# Patient Record
Sex: FEMALE | Race: WHITE | Employment: FULL TIME
[De-identification: names, ages, dates, MRNs, and addresses within clinical notes are randomized per-mention and may not be internally consistent; named-entity substitution may affect disease eponyms.]

---

## 2017-10-01 ENCOUNTER — HEALTH MAINTENANCE LETTER (OUTPATIENT)
Age: 32
End: 2017-10-01

## 2019-02-05 ENCOUNTER — HOSPITAL ENCOUNTER (EMERGENCY)
Facility: CLINIC | Age: 34
Discharge: HOME | End: 2019-02-05
Admitting: PHYSICIAN ASSISTANT
Payer: COMMERCIAL

## 2019-02-05 VITALS
RESPIRATION RATE: 16 BRPM | BODY MASS INDEX: 24.11 KG/M2 | OXYGEN SATURATION: 100 % | WEIGHT: 150 LBS | HEIGHT: 66 IN | DIASTOLIC BLOOD PRESSURE: 73 MMHG | TEMPERATURE: 98.2 F | SYSTOLIC BLOOD PRESSURE: 126 MMHG

## 2019-02-05 LAB
ALBUMIN SERPL-MCNC: 3.7 G/DL (ref 3.4–5)
ALP SERPL-CCNC: 57 U/L (ref 40–150)
ALT SERPL W P-5'-P-CCNC: 32 U/L (ref 0–50)
ANION GAP SERPL CALCULATED.3IONS-SCNC: 7 MMOL/L (ref 3–14)
AST SERPL W P-5'-P-CCNC: 20 U/L (ref 0–45)
BASOPHILS # BLD AUTO: 0 10E9/L (ref 0–0.2)
BASOPHILS NFR BLD AUTO: 0.5 %
BILIRUB SERPL-MCNC: 0.3 MG/DL (ref 0.2–1.3)
BUN SERPL-MCNC: 16 MG/DL (ref 7–30)
CALCIUM SERPL-MCNC: 8.2 MG/DL (ref 8.5–10.1)
CHLORIDE SERPL-SCNC: 109 MMOL/L (ref 94–109)
CO2 SERPL-SCNC: 25 MMOL/L (ref 20–32)
CREAT SERPL-MCNC: 0.78 MG/DL (ref 0.52–1.04)
DIFFERENTIAL METHOD BLD: NORMAL
EOSINOPHIL # BLD AUTO: 0.2 10E9/L (ref 0–0.7)
EOSINOPHIL NFR BLD AUTO: 2.5 %
ERYTHROCYTE [DISTWIDTH] IN BLOOD BY AUTOMATED COUNT: 11.7 % (ref 10–15)
GFR SERPL CREATININE-BSD FRML MDRD: >90 ML/MIN/{1.73_M2}
GLUCOSE SERPL-MCNC: 108 MG/DL (ref 70–99)
HCT VFR BLD AUTO: 39.1 % (ref 35–47)
HGB BLD-MCNC: 13.4 G/DL (ref 11.7–15.7)
IMM GRANULOCYTES # BLD: 0 10E9/L (ref 0–0.4)
IMM GRANULOCYTES NFR BLD: 0.2 %
INTERPRETATION ECG - MUSE: NORMAL
LYMPHOCYTES # BLD AUTO: 2.4 10E9/L (ref 0.8–5.3)
LYMPHOCYTES NFR BLD AUTO: 40.1 %
MCH RBC QN AUTO: 30.8 PG (ref 26.5–33)
MCHC RBC AUTO-ENTMCNC: 34.3 G/DL (ref 31.5–36.5)
MCV RBC AUTO: 90 FL (ref 78–100)
MONOCYTES # BLD AUTO: 0.4 10E9/L (ref 0–1.3)
MONOCYTES NFR BLD AUTO: 6.7 %
NEUTROPHILS # BLD AUTO: 3 10E9/L (ref 1.6–8.3)
NEUTROPHILS NFR BLD AUTO: 50 %
NRBC # BLD AUTO: 0 10*3/UL
NRBC BLD AUTO-RTO: 0 /100
PLATELET # BLD AUTO: 236 10E9/L (ref 150–450)
POTASSIUM SERPL-SCNC: 3.4 MMOL/L (ref 3.4–5.3)
PROT SERPL-MCNC: 7 G/DL (ref 6.8–8.8)
RBC # BLD AUTO: 4.35 10E12/L (ref 3.8–5.2)
SODIUM SERPL-SCNC: 141 MMOL/L (ref 133–144)
TROPONIN I SERPL-MCNC: <0.015 UG/L (ref 0–0.04)
WBC # BLD AUTO: 6 10E9/L (ref 4–11)

## 2019-02-05 PROCEDURE — 85025 COMPLETE CBC W/AUTO DIFF WBC: CPT | Performed by: EMERGENCY MEDICINE

## 2019-02-05 PROCEDURE — 80053 COMPREHEN METABOLIC PANEL: CPT | Performed by: EMERGENCY MEDICINE

## 2019-02-05 PROCEDURE — 40000268 ZZH STATISTIC NO CHARGES

## 2019-02-05 PROCEDURE — 84484 ASSAY OF TROPONIN QUANT: CPT | Performed by: EMERGENCY MEDICINE

## 2019-02-05 ASSESSMENT — MIFFLIN-ST. JEOR: SCORE: 1392.15

## 2019-07-09 ENCOUNTER — OFFICE VISIT (OUTPATIENT)
Dept: FAMILY MEDICINE | Facility: CLINIC | Age: 34
End: 2019-07-09
Payer: COMMERCIAL

## 2019-07-09 VITALS
DIASTOLIC BLOOD PRESSURE: 64 MMHG | SYSTOLIC BLOOD PRESSURE: 102 MMHG | HEART RATE: 90 BPM | TEMPERATURE: 98.5 F | RESPIRATION RATE: 16 BRPM | HEIGHT: 66 IN | BODY MASS INDEX: 23.3 KG/M2 | OXYGEN SATURATION: 99 % | WEIGHT: 145 LBS

## 2019-07-09 DIAGNOSIS — T78.40XD ALLERGIC DISORDER, SUBSEQUENT ENCOUNTER: ICD-10-CM

## 2019-07-09 DIAGNOSIS — R07.0 THROAT PAIN: ICD-10-CM

## 2019-07-09 DIAGNOSIS — J40 BRONCHITIS: ICD-10-CM

## 2019-07-09 DIAGNOSIS — R05.9 COUGH: Primary | ICD-10-CM

## 2019-07-09 LAB
DEPRECATED S PYO AG THROAT QL EIA: NORMAL
SPECIMEN SOURCE: NORMAL

## 2019-07-09 PROCEDURE — 99203 OFFICE O/P NEW LOW 30 MIN: CPT | Performed by: FAMILY MEDICINE

## 2019-07-09 PROCEDURE — 87880 STREP A ASSAY W/OPTIC: CPT | Performed by: FAMILY MEDICINE

## 2019-07-09 PROCEDURE — 87081 CULTURE SCREEN ONLY: CPT | Performed by: FAMILY MEDICINE

## 2019-07-09 RX ORDER — CEFPROZIL 500 MG/1
500 TABLET, FILM COATED ORAL 2 TIMES DAILY
Qty: 20 TABLET | Refills: 0 | Status: SHIPPED | OUTPATIENT
Start: 2019-07-09 | End: 2020-12-29

## 2019-07-09 RX ORDER — FEXOFENADINE HCL 180 MG/1
180 TABLET ORAL DAILY
Qty: 30 TABLET | COMMUNITY
Start: 2019-07-09 | End: 2020-12-29

## 2019-07-09 ASSESSMENT — MIFFLIN-ST. JEOR: SCORE: 1369.47

## 2019-07-09 NOTE — PROGRESS NOTES
Subjective     Farida Dasilva is a 33 year old female who presents to clinic today for the following health issues:    HPI   RESPIRATORY SYMPTOMS      Duration: 3 weeks    Description  sore throat and cough and some phlegm. Getting darker brown again and yellowish . sometimes felt sob but no wheezing . She was also given oral prednisone and inhaler. She thinks  prednisone helped although not sure if inhaler helped much,  so used for couple of days earlier but not any more .      Has hx of allergies mostly to cats, but does  not really does not take anything for that except OTC med's  when she is around cats , and she was around cats recently , but not any more.  but wondering if she could be developing allergies to other things in environment.         she has used inhaler  Sometimes, only  when she is sick .     Severity: moderate    Accompanying signs and symptoms: No fever or chills recently except ongoing cough, worse at night, has some phlegm in throat and coughing up thick dark phlegm again, so feels like z pack did not help much     History (predisposing factors):  none    Precipitating or alleviating factors: None    Therapies tried and outcome:  zpak from urgent care. It helped at first but not anymore          Reviewed and updated as needed this visit by Provider         Review of Systems   ROS COMP: Constitutional, HEENT, cardiovascular, pulmonary, GI, , musculoskeletal, neuro, skin, endocrine and psych systems are negative, except as otherwise noted.      Objective    There were no vitals taken for this visit.  There is no height or weight on file to calculate BMI.  Physical Exam   GENERAL: healthy, alert and no distress  EYES: Eyes grossly normal to inspection,   HENT: ear canals and TM's normal, nasal mucosa edematous , oropharynx clear and oral mucous membranes moist  NECK: no adenopathy, no asymmetry, masses, or scars and thyroid normal to palpation  RESP: lungs clear to auscultation - no rales,  rhonchi or wheezes  CV: regular rate and rhythm, normal S1 S2,   ABDOMEN: soft, nontender, no hepatosplenomegaly, no masses and bowel sounds normal    Diagnostic Test Results:  Labs reviewed in Epic  Strep screen - Negative        Assessment & Plan     (R05) Cough  (primary encounter diagnosis)  Comment: multifactorial recent ongoing uri / allergies   Plan: cefPROZIL (CEFZIL) 500 MG tablet, fexofenadine         (ALLEGRA) 180 MG tablet            (R07.0) Throat pain  Comment:   Plan: Strep, Rapid Screen, Beta strep group A culture          Rapid strep negative, strep culture pending. Call and treat only if positive.    (J40) Bronchitis  Comment:   Plan: cefPROZIL (CEFZIL) 500 MG tablet            URI lingering onto bronchitis. Treat with Cefzil .  Cares and symptomatic treatment discussed follow up if problem       (T78.40XD) Allergic disorder, subsequent encounter  Comment: pollen, also cat and other animal   Plan: fexofenadine (ALLEGRA) 180 MG tablet        Willing to trying OTC allegra to use to see if help with cough. May use inhaler if needed. Need to monitor and f/u if ongoing problem         Patient expressed understanding and agreement with treatment plan. All patient's questions were answered, will let me know if has more later.  Medications: Rx's: Reviewed the potential side effects/complications of medications prescribed.     Skylar Madrigal MD  [facility] [place]

## 2019-07-10 LAB
BACTERIA SPEC CULT: NORMAL
SPECIMEN SOURCE: NORMAL

## 2019-09-13 ENCOUNTER — OFFICE VISIT (OUTPATIENT)
Dept: FAMILY MEDICINE | Facility: CLINIC | Age: 34
End: 2019-09-13
Payer: COMMERCIAL

## 2019-09-13 VITALS
OXYGEN SATURATION: 97 % | HEART RATE: 98 BPM | TEMPERATURE: 99.1 F | WEIGHT: 141.2 LBS | HEIGHT: 66 IN | DIASTOLIC BLOOD PRESSURE: 62 MMHG | BODY MASS INDEX: 22.69 KG/M2 | SYSTOLIC BLOOD PRESSURE: 106 MMHG

## 2019-09-13 DIAGNOSIS — G43.009 MIGRAINE WITHOUT AURA AND WITHOUT STATUS MIGRAINOSUS, NOT INTRACTABLE: Primary | ICD-10-CM

## 2019-09-13 DIAGNOSIS — F43.22 ADJUSTMENT DISORDER WITH ANXIOUS MOOD: ICD-10-CM

## 2019-09-13 PROCEDURE — 99215 OFFICE O/P EST HI 40 MIN: CPT | Performed by: FAMILY MEDICINE

## 2019-09-13 RX ORDER — PROPRANOLOL HYDROCHLORIDE 10 MG/1
10 TABLET ORAL 3 TIMES DAILY PRN
Qty: 30 TABLET | Refills: 1 | Status: SHIPPED | OUTPATIENT
Start: 2019-09-13 | End: 2019-09-13

## 2019-09-13 RX ORDER — PROPRANOLOL HYDROCHLORIDE 10 MG/1
10 TABLET ORAL 3 TIMES DAILY PRN
Qty: 30 TABLET | Refills: 1 | Status: SHIPPED | OUTPATIENT
Start: 2019-09-13 | End: 2020-12-29

## 2019-09-13 RX ORDER — IBUPROFEN 200 MG
200 TABLET ORAL EVERY 4 HOURS PRN
COMMUNITY
End: 2020-12-29

## 2019-09-13 ASSESSMENT — MIFFLIN-ST. JEOR: SCORE: 1352.23

## 2019-09-13 NOTE — PROGRESS NOTES
"Subjective     Farida Pinto is a 33 year old female who presents to clinic today for the following health issues:    [facility]   ED/UC Followup:    Facility:  [facility]  Date of visit: 9/11/2019  Reason for visit: 9/11/2019  Current Status: Pericarditis, is what she was diagnosed with. Did an EKG and ultrasound and xrays and everything came back normal. Now has a terrible migraine on left side of head, feels like it is associated with her ear. Had a virus last week and hasnt had an appetite since then      Patient reports that she became sick last weekend, believes she may have picked something up from her young daughter. Her symptoms began with chills and a fever of 102.5. She then felt week and fatigued, with some diarrhea. Patient began to improve earlier this week, but she then developed significant chest pain, \"felt like my heart was hurting\". This concerned her enough that she went to the ER two days ago. Blood work and EKG appeared mostly normal, although the EKG showed some minor abnormalities. An ultrasound and x-ray were also done. Patient was eventually diagnosed with pericarditis, advised to take Advil and discharged. She developed a very painful migraine yesterday on the left side of her head, notes that her left ear has been painful as well. Patient is taking 600 mg Advil TID, expresses that her migraine has improved today. She reports that her appetite has been decreased, has been trying to get plenty of fluids. Patient notes that she was told she has slight mitral valve prolapse in the past, her older sister has also been diagnosed with this. She states that she has experienced intermittent chest pain for many years. Denies coughing up phlegm, bladder symptoms. Denies past medical history of mono.      Patient Active Problem List   Diagnosis     Anxiety state     Mitral valve disorder     Neck pain     Past Surgical History:   Procedure Laterality Date     EYE SURGERY         Social " History     Tobacco Use     Smoking status: Passive Smoke Exposure - Never Smoker     Smokeless tobacco: Never Used   Substance Use Topics     Alcohol use: Yes     Comment: 8 per week     Family History   Problem Relation Age of Onset     Allergies Mother      Eye Disorder Mother      Breast Cancer Maternal Grandmother      Arthritis Maternal Grandmother      Arthritis Maternal Grandfather      C.A.D. Paternal Grandmother      Breast Cancer Paternal Grandmother      Alzheimer Disease Paternal Grandmother      Arthritis Paternal Grandmother      Cardiovascular Paternal Grandmother      Heart Disease Paternal Grandmother      Alcohol/Drug Paternal Grandfather      Cardiovascular Paternal Grandfather      Heart Disease Paternal Grandfather      Asthma Sister      Cerebrovascular Disease Father          Current Outpatient Medications   Medication Sig Dispense Refill     ibuprofen (ADVIL/MOTRIN) 200 MG tablet Take 200 mg by mouth every 4 hours as needed for pain (3 times a day)       propranolol (INDERAL) 10 MG tablet Take 1 tablet (10 mg) by mouth 3 times daily as needed (heart) 30 tablet 1     cefPROZIL (CEFZIL) 500 MG tablet Take 1 tablet (500 mg) by mouth 2 times daily (Patient not taking: Reported on 9/13/2019) 20 tablet 0     fexofenadine (ALLEGRA) 180 MG tablet Take 1 tablet (180 mg) by mouth daily (Patient not taking: Reported on 9/13/2019) 30 tablet      No Known Allergies  BP Readings from Last 3 Encounters:   09/13/19 106/62   07/09/19 102/64   11/08/13 108/66    Wt Readings from Last 3 Encounters:   09/13/19 64 kg (141 lb 3.2 oz)   07/09/19 65.8 kg (145 lb)   11/08/13 66.6 kg (146 lb 14.4 oz)            Reviewed and updated as needed this visit by Provider         Review of Systems   POSITIVE migraine    Denies headache, insomnia, chest pain, shortness of breath, cough, heartburn, bowel issues, bladder issues, neck pain, back pain, hip pain, knee pain, ankle pain, or foot pain. Remainder of ROS is negative  "unless otherwise noted above or in HPI.    This document serves as a record of the services and decisions personally performed and made by Montana Harrison MD. It was created on his behalf by João Black, trained medical scribe. The creation of this document is based on the provider's statements to the medical scribe.  João Black 2:26 PM September 13, 2019        Objective    /62   Pulse 98   Temp 99.1  F (37.3  C) (Oral)   Ht 1.676 m (5' 6\")   Wt 64 kg (141 lb 3.2 oz)   SpO2 97%   BMI 22.79 kg/m    Body mass index is 22.79 kg/m .  Physical Exam   GENERAL: healthy, alert and no distress  HENT: left TM is retracted, right ear canal and TM normal, mucous membranes are mildly swollen  NECK: no adenopathy, no asymmetry, masses, or scars and thyroid normal to palpation  RESP: lungs clear to auscultation - no rales, rhonchi or wheezes  CV: regular rate and rhythm, normal S1 S2, no S3 or S4, no murmur, click or rub, no peripheral edema and peripheral pulses strong  ABDOMEN: soft, nontender, no hepatosplenomegaly, no masses and bowel sounds normal  MS: no gross musculoskeletal defects noted, no edema  SKIN: no suspicious lesions or rashes  NEURO: Normal strength and tone, mentation intact and speech normal  PSYCH: mentation appears normal, affect normal/bright     Diagnostic Test Results:  Labs reviewed in Epic  none         Assessment & Plan   (G43.009) Migraine without aura and without status migrainosus, not intractable  (primary encounter diagnosis)  Comment: Improving slightly, but still painful.  Plan: propranolol (INDERAL) 10 MG tablet,         DISCONTINUED: propranolol (INDERAL) 10 MG         tablet        Advised patient begin taking propranolol prn, take 200 mg ibuprofen TID, and tylenol prn. Follow up as needed.    (F43.22) Adjustment disorder with anxious mood  Comment: Possibly causing the migraine.  Plan: Begin taking propranolol prn. Follow up as needed.        Patient Instructions   Take 200 " mg Ibuprofen with breakfast, lunch, and dinner. Extra strength Tylenol and propranolol will help as well.    Try to keep your fluids up.     40 minutes were spent by me face to face with the patient with more than 50% of time spent in counseling and coordination of care regarding above assessment and plan.       The information in this document, created by the medical scribe for me, accurately reflects the services I personally performed and the decisions made by me. I have reviewed and approved this document for accuracy prior to leaving the patient care area.  September 13, 2019 2:26 PM    Montana Harrison MD  [facility] [place]

## 2019-09-13 NOTE — PATIENT INSTRUCTIONS
Take 200 mg Ibuprofen with breakfast, lunch, and dinner. Extra strength Tylenol and propranolol will help as well.    Try to keep your fluids up.

## 2019-09-16 ENCOUNTER — OFFICE VISIT (OUTPATIENT)
Dept: FAMILY MEDICINE | Facility: CLINIC | Age: 34
End: 2019-09-16
Payer: COMMERCIAL

## 2019-09-16 VITALS
SYSTOLIC BLOOD PRESSURE: 110 MMHG | BODY MASS INDEX: 21.74 KG/M2 | WEIGHT: 138.5 LBS | HEIGHT: 67 IN | HEART RATE: 89 BPM | DIASTOLIC BLOOD PRESSURE: 68 MMHG | TEMPERATURE: 98.7 F | RESPIRATION RATE: 14 BRPM | OXYGEN SATURATION: 99 %

## 2019-09-16 DIAGNOSIS — B99.9 FEVER DUE TO INFECTION: ICD-10-CM

## 2019-09-16 DIAGNOSIS — J02.9 ACUTE SORE THROAT: Primary | ICD-10-CM

## 2019-09-16 LAB
DEPRECATED S PYO AG THROAT QL EIA: NORMAL
HETEROPH AB SER QL: NEGATIVE
SPECIMEN SOURCE: NORMAL
WBC # BLD AUTO: 7.8 10E9/L (ref 4–11)

## 2019-09-16 PROCEDURE — 87081 CULTURE SCREEN ONLY: CPT | Performed by: FAMILY MEDICINE

## 2019-09-16 PROCEDURE — 85048 AUTOMATED LEUKOCYTE COUNT: CPT | Performed by: FAMILY MEDICINE

## 2019-09-16 PROCEDURE — 87880 STREP A ASSAY W/OPTIC: CPT | Performed by: FAMILY MEDICINE

## 2019-09-16 PROCEDURE — 36415 COLL VENOUS BLD VENIPUNCTURE: CPT | Performed by: FAMILY MEDICINE

## 2019-09-16 PROCEDURE — 86308 HETEROPHILE ANTIBODY SCREEN: CPT | Performed by: FAMILY MEDICINE

## 2019-09-16 PROCEDURE — 99214 OFFICE O/P EST MOD 30 MIN: CPT | Performed by: FAMILY MEDICINE

## 2019-09-16 ASSESSMENT — MIFFLIN-ST. JEOR: SCORE: 1348.47

## 2019-09-16 NOTE — PROGRESS NOTES
Subjective     Farida Pinto is a 33 year old female who presents to clinic today for the following health issues:    HPI   Acute Illness   Acute illness concerns: ongoing fever and exhaustion  Onset: 09/06/19    Fever: YES    Chills/Sweats: YES    Headache (location?): YES    Sinus Pressure:no    Conjunctivitis:  No, hard to focus    Ear Pain: no    Rhinorrhea: no    Congestion: no    Sore Throat: YES- little sore today     Cough: no    Wheeze: no    Decreased Appetite: YES    Nausea: no    Vomiting: YES- 09/13/19    Diarrhea:  No, haven't had a bowel movement in 3-4 days    Dysuria/Freq.: no    Fatigue/Achiness: YES    Sick/Strep Exposure: YES- kids     Therapies Tried and outcome: Dayquil this morning, 102 temp at 5 this morning    Patient reports having a high fever, exhaustion, grogginess, difficulty focusing over the weekend. States that he has had a decreased appetite, has been trying to keep her fluids up. She vomited once over the weekend, but it was after eating a [employer] cheeseburger. She has not had a bowel movement in the last couple days which is not normal for her. Patient reports an ongoing headache, it is not as painful as the migraine she had 5 days ago. States that her throat became mildly sore this morning, denies cough. Patient has been concerned and wants to make sure there isn't something seriously wrong. Her infant child has had a fever recently. Denies dysuria.       Patient Active Problem List   Diagnosis     Anxiety state     Mitral valve disorder     Adjustment disorder with anxious mood     Fever due to infection     Past Surgical History:   Procedure Laterality Date     EYE SURGERY         Social History     Tobacco Use     Smoking status: Passive Smoke Exposure - Never Smoker     Smokeless tobacco: Never Used   Substance Use Topics     Alcohol use: Yes     Comment: 8 per week     Family History   Problem Relation Age of Onset     Allergies Mother      Eye Disorder Mother       Breast Cancer Maternal Grandmother      Arthritis Maternal Grandmother      Arthritis Maternal Grandfather      C.A.D. Paternal Grandmother      Breast Cancer Paternal Grandmother      Alzheimer Disease Paternal Grandmother      Arthritis Paternal Grandmother      Cardiovascular Paternal Grandmother      Heart Disease Paternal Grandmother      Alcohol/Drug Paternal Grandfather      Cardiovascular Paternal Grandfather      Heart Disease Paternal Grandfather      Asthma Sister      Cerebrovascular Disease Father          Current Outpatient Medications   Medication Sig Dispense Refill     ibuprofen (ADVIL/MOTRIN) 200 MG tablet Take 200 mg by mouth every 4 hours as needed for pain (3 times a day)       cefPROZIL (CEFZIL) 500 MG tablet Take 1 tablet (500 mg) by mouth 2 times daily (Patient not taking: Reported on 9/13/2019) 20 tablet 0     fexofenadine (ALLEGRA) 180 MG tablet Take 1 tablet (180 mg) by mouth daily (Patient not taking: Reported on 9/13/2019) 30 tablet      propranolol (INDERAL) 10 MG tablet Take 1 tablet (10 mg) by mouth 3 times daily as needed (heart) (Patient not taking: Reported on 9/16/2019) 30 tablet 1     No Known Allergies  BP Readings from Last 3 Encounters:   09/16/19 110/68   09/13/19 106/62   07/09/19 102/64    Wt Readings from Last 3 Encounters:   09/16/19 62.8 kg (138 lb 8 oz)   09/13/19 64 kg (141 lb 3.2 oz)   07/09/19 65.8 kg (145 lb)                    Reviewed and updated as needed this visit by Provider         Review of Systems   POSITIVE fatigue, fever, sore throat, headache    Denies insomnia, chest pain, shortness of breath, cough, heartburn, bowel issues, bladder issues, neck pain, back pain, hip pain, knee pain, ankle pain, or foot pain. Remainder of ROS is negative unless otherwise noted above or in HPI.    This document serves as a record of the services and decisions personally performed and made by Montana Harrison MD. It was created on his behalf by João Black, [employer]  "scribe. The creation of this document is based on the provider's statements to the medical scribe.  João Black 10:52 AM September 16, 2019        Objective    /68   Pulse 89   Temp 98.7  F (37.1  C) (Oral)   Resp 14   Ht 1.69 m (5' 6.54\")   Wt 62.8 kg (138 lb 8 oz)   LMP 09/03/2019 (Approximate)   SpO2 99%   Breastfeeding? No   BMI 22.00 kg/m    Body mass index is 22 kg/m .  Physical Exam   GENERAL: healthy, alert and no distress  HENT: ear canals and TM's normal, nose and mouth without ulcers or lesions  NECK: no adenopathy, no asymmetry, masses, or scars and thyroid normal to palpation  RESP: lungs clear to auscultation - no rales, rhonchi or wheezes  CV: regular rate and rhythm, normal S1 S2, no S3 or S4, no murmur, click or rub, no peripheral edema and peripheral pulses strong  ABDOMEN: soft, nontender, no hepatosplenomegaly, no masses and bowel sounds normal  MS: no gross musculoskeletal defects noted, no edema  SKIN: no suspicious lesions or rashes  NEURO: Normal strength and tone, mentation intact and speech normal  PSYCH: mentation appears normal, affect normal/bright    Diagnostic Test Results:  Labs reviewed in Epic  Results for orders placed or performed in visit on 09/16/19 (from the past 24 hour(s))   Strep, Rapid Screen   Result Value Ref Range    Specimen Description Throat     Rapid Strep A Screen       NEGATIVE: No Group A streptococcal antigen detected by immunoassay, await culture report.   WBC count   Result Value Ref Range    WBC 7.8 4.0 - 11.0 10e9/L   Mononucleosis screen   Result Value Ref Range    Mononucleosis Screen Negative NEG^Negative           Assessment & Plan   (J02.9) Acute sore throat  (primary encounter diagnosis)  Comment: Mono, rapid strep negative.  Plan: Mononucleosis screen, Strep, Rapid Screen, Beta        strep group A culture        Encouraged rest and fluids. Follow up as needed.    (R50.81,  B99.9) Fever due to infection  Comment: WBC normal.  Plan: " WBC count        Encouraged rest and fluids. Follow up as needed.      Patient Instructions   If you are still experiencing high fevers in the next few days, contact me.     25 minutes were spent by me face to face with the patient with more than 50% of time spent in counseling and coordination of care regarding above assessment and plan.        The information in this document, created by the medical scribe for me, accurately reflects the services I personally performed and the decisions made by me. I have reviewed and approved this document for accuracy prior to leaving the patient care area.  September 16, 2019 10:53 AM    Montana Harrison MD  [facility] [place]

## 2019-09-17 LAB
BACTERIA SPEC CULT: NORMAL
SPECIMEN SOURCE: NORMAL

## 2019-09-19 NOTE — RESULT ENCOUNTER NOTE
Tyler Iqbal, your recent strep confirmatory culture is normal. Please contact if any questions, or if not feeling better.   Montana

## 2020-02-23 ENCOUNTER — HEALTH MAINTENANCE LETTER (OUTPATIENT)
Age: 35
End: 2020-02-23

## 2020-03-03 ENCOUNTER — OFFICE VISIT (OUTPATIENT)
Dept: FAMILY MEDICINE | Facility: CLINIC | Age: 35
End: 2020-03-03
Payer: COMMERCIAL

## 2020-03-03 VITALS
SYSTOLIC BLOOD PRESSURE: 110 MMHG | DIASTOLIC BLOOD PRESSURE: 80 MMHG | OXYGEN SATURATION: 99 % | HEART RATE: 75 BPM | BODY MASS INDEX: 23.35 KG/M2 | TEMPERATURE: 98.1 F | WEIGHT: 147 LBS

## 2020-03-03 DIAGNOSIS — F43.22 ADJUSTMENT DISORDER WITH ANXIOUS MOOD: ICD-10-CM

## 2020-03-03 DIAGNOSIS — R10.12 ABDOMINAL PAIN, LEFT UPPER QUADRANT: ICD-10-CM

## 2020-03-03 DIAGNOSIS — K21.9 GASTROESOPHAGEAL REFLUX DISEASE, ESOPHAGITIS PRESENCE NOT SPECIFIED: ICD-10-CM

## 2020-03-03 DIAGNOSIS — K29.00 OTHER ACUTE GASTRITIS WITHOUT HEMORRHAGE: Primary | ICD-10-CM

## 2020-03-03 LAB
ALBUMIN SERPL-MCNC: 3.9 G/DL (ref 3.4–5)
ALBUMIN UR-MCNC: NEGATIVE MG/DL
ALP SERPL-CCNC: 44 U/L (ref 40–150)
ALT SERPL W P-5'-P-CCNC: 19 U/L (ref 0–50)
ANION GAP SERPL CALCULATED.3IONS-SCNC: 8 MMOL/L (ref 3–14)
APPEARANCE UR: CLEAR
AST SERPL W P-5'-P-CCNC: 10 U/L (ref 0–45)
BACTERIA #/AREA URNS HPF: ABNORMAL /HPF
BILIRUB SERPL-MCNC: 0.6 MG/DL (ref 0.2–1.3)
BILIRUB UR QL STRIP: NEGATIVE
BUN SERPL-MCNC: 14 MG/DL (ref 7–30)
CALCIUM SERPL-MCNC: 8.8 MG/DL (ref 8.5–10.1)
CHLORIDE SERPL-SCNC: 107 MMOL/L (ref 94–109)
CO2 SERPL-SCNC: 23 MMOL/L (ref 20–32)
COLOR UR AUTO: YELLOW
CREAT SERPL-MCNC: 0.68 MG/DL (ref 0.52–1.04)
ERYTHROCYTE [DISTWIDTH] IN BLOOD BY AUTOMATED COUNT: 12.6 % (ref 10–15)
GFR SERPL CREATININE-BSD FRML MDRD: >90 ML/MIN/{1.73_M2}
GLUCOSE SERPL-MCNC: 89 MG/DL (ref 70–99)
GLUCOSE UR STRIP-MCNC: NEGATIVE MG/DL
HCT VFR BLD AUTO: 43.6 % (ref 35–47)
HGB BLD-MCNC: 14.2 G/DL (ref 11.7–15.7)
HGB UR QL STRIP: NEGATIVE
KETONES UR STRIP-MCNC: NEGATIVE MG/DL
LEUKOCYTE ESTERASE UR QL STRIP: ABNORMAL
LIPASE SERPL-CCNC: 98 U/L (ref 73–393)
MCH RBC QN AUTO: 30 PG (ref 26.5–33)
MCHC RBC AUTO-ENTMCNC: 32.6 G/DL (ref 31.5–36.5)
MCV RBC AUTO: 92 FL (ref 78–100)
NITRATE UR QL: NEGATIVE
NON-SQ EPI CELLS #/AREA URNS LPF: ABNORMAL /LPF
PH UR STRIP: 7 PH (ref 5–7)
PLATELET # BLD AUTO: 310 10E9/L (ref 150–450)
POTASSIUM SERPL-SCNC: 3.6 MMOL/L (ref 3.4–5.3)
PROT SERPL-MCNC: 7.7 G/DL (ref 6.8–8.8)
RBC # BLD AUTO: 4.73 10E12/L (ref 3.8–5.2)
RBC #/AREA URNS AUTO: ABNORMAL /HPF
SODIUM SERPL-SCNC: 139 MMOL/L (ref 133–144)
SOURCE: ABNORMAL
SP GR UR STRIP: 1.01 (ref 1–1.03)
TSH SERPL DL<=0.005 MIU/L-ACNC: 1.12 MU/L (ref 0.4–4)
UROBILINOGEN UR STRIP-ACNC: 0.2 EU/DL (ref 0.2–1)
WBC # BLD AUTO: 8.1 10E9/L (ref 4–11)
WBC #/AREA URNS AUTO: ABNORMAL /HPF

## 2020-03-03 PROCEDURE — 99214 OFFICE O/P EST MOD 30 MIN: CPT | Performed by: FAMILY MEDICINE

## 2020-03-03 PROCEDURE — 80053 COMPREHEN METABOLIC PANEL: CPT | Performed by: FAMILY MEDICINE

## 2020-03-03 PROCEDURE — 85027 COMPLETE CBC AUTOMATED: CPT | Performed by: FAMILY MEDICINE

## 2020-03-03 PROCEDURE — 36415 COLL VENOUS BLD VENIPUNCTURE: CPT | Performed by: FAMILY MEDICINE

## 2020-03-03 PROCEDURE — 83690 ASSAY OF LIPASE: CPT | Performed by: FAMILY MEDICINE

## 2020-03-03 PROCEDURE — 81001 URINALYSIS AUTO W/SCOPE: CPT | Performed by: FAMILY MEDICINE

## 2020-03-03 PROCEDURE — 84443 ASSAY THYROID STIM HORMONE: CPT | Performed by: FAMILY MEDICINE

## 2020-03-03 RX ORDER — LORAZEPAM 1 MG/1
1 TABLET ORAL EVERY 8 HOURS PRN
Qty: 8 TABLET | Refills: 0 | Status: SHIPPED | OUTPATIENT
Start: 2020-03-03 | End: 2020-12-29

## 2020-03-03 NOTE — PROGRESS NOTES
Subjective     Farida Pinto is a 34 year old female who presents to clinic today for the following health issues:    HPI   Rib pain    Onset: on and off 2 weeks    Description:   Location: left side of rib cage   Character: throbbing and tightness     Intensity: mild    Progression of Symptoms: worse    Accompanying Signs & Symptoms:  Other symptoms: radiation of pain to left arm sometimes has chest pain. Went into 2019 ER with chest pain did EKG and patient was not having heart attack but ER doctor said patient possibly had pericarditis. 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     History:   Previous similar pain: no       Precipitating factors:   Trauma or overuse: no     Alleviating factors:  Improved by: pain goes away on its own    Therapies Tried and outcome: none       Patient had a yeast infection and still is feeling some itching.    Current Outpatient Medications   Medication Sig Dispense Refill     ibuprofen (ADVIL/MOTRIN) 200 MG tablet Take 200 mg by mouth every 4 hours as needed for pain (3 times a day)       LORazepam (ATIVAN) 1 MG tablet Take 1 tablet (1 mg) by mouth every 8 hours as needed for anxiety (for flying) 8 tablet 0     omeprazole (PRILOSEC) 20 MG DR capsule Take 1 capsule (20 mg) by mouth daily 30 capsule 1     cefPROZIL (CEFZIL) 500 MG tablet Take 1 tablet (500 mg) by mouth 2 times daily (Patient not taking: Reported on 9/13/2019) 20 tablet 0     fexofenadine (ALLEGRA) 180 MG tablet Take 1 tablet (180 mg) by mouth daily (Patient not taking: Reported on 9/13/2019) 30 tablet      propranolol (INDERAL) 10 MG tablet Take 1 tablet (10 mg) by mouth 3 times daily as needed (heart) (Patient not taking: Reported on 9/16/2019) 30 tablet 1     Encounter Diagnoses   Name Primary?     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      Objective    /80   Pulse 75   Temp 98.1  F (36.7  C) (Oral)   Wt 66.7 kg (147 lb)   LMP 02/25/2020 (Exact Date)   SpO2 99%   Breastfeeding No   BMI 23.35 kg/m    Body mass index is 23.35 kg/m .  Physical Exam   GENERAL: alert and fatigued  EYES: Eyes grossly normal to inspection, PERRL and conjunctivae and sclerae normal  HENT: ear canals and TM's normal, nose and mouth without ulcers or lesions  NECK: no adenopathy, no asymmetry, masses, or scars and thyroid normal to palpation  RESP: lungs clear to auscultation - no rales, rhonchi or wheezes  BREAST: normal without masses, tenderness or nipple discharge and no palpable axillary masses or adenopathy  CV: regular rate and rhythm, normal S1 S2, no S3 or S4, no murmur, click or rub, no peripheral edema and peripheral pulses strong  ABDOMEN: tenderness epigastric and bowel sounds normal  MS: no gross musculoskeletal defects noted, no edema  SKIN: no suspicious lesions or rashes  NEURO: Normal strength and tone, mentation intact and speech normal  BACK: no CVA tenderness, no paralumbar tenderness  PSYCH: mentation appears normal, tearful, anxious, fatigued and judgement and insight intact  LYMPH: no cervical, supraclavicular, axillary, or inguinal adenopathy    Diagnostic Test Results:  Labs reviewed in Epic        Assessment & Plan     1. Other acute gastritis without hemorrhage  Work on exercise, see therapist, cut back caffeine  - omeprazole (PRILOSEC) 20 MG DR capsule; Take 1 capsule (20 mg) by mouth daily  Dispense: 30 capsule; Refill: 1  - CBC with platelets  - TSH with free T4 reflex  - Comprehensive metabolic panel (BMP + Alb, Alk Phos, ALT, AST,  Total. Bili, TP)  - Urine Microscopic  Follow up in 1 month.   2. Adjustment disorder with anxious mood  See therapist, use treadmill 4-5/ week  - Lorazepam (ATIVAN) 1 MG tablet; Take 1 tablet (1 mg) by mouth every 8 hours as needed for anxiety (for flying)  Dispense: 8 tablet; Refill: 0    3. Abdominal pain, left upper quadrant  Check lab  - Lipase  - *UA reflex to Microscopic    4. Gastroesophageal reflux disease, esophagitis presence not specified  Try PPI         Regular exercise  See Patient Instructions    No follow-ups on file.    Kojo Fuentes MD  [facility] [place]

## 2020-12-12 ENCOUNTER — HEALTH MAINTENANCE LETTER (OUTPATIENT)
Age: 35
End: 2020-12-12

## 2020-12-29 ENCOUNTER — VIRTUAL VISIT (OUTPATIENT)
Dept: FAMILY MEDICINE | Facility: CLINIC | Age: 35
End: 2020-12-29
Payer: COMMERCIAL

## 2020-12-29 DIAGNOSIS — U07.1 2019 NOVEL CORONAVIRUS DISEASE (COVID-19): Primary | ICD-10-CM

## 2020-12-29 PROCEDURE — 99213 OFFICE O/P EST LOW 20 MIN: CPT | Mod: 95 | Performed by: FAMILY MEDICINE

## 2020-12-29 NOTE — PROGRESS NOTES
"Farida Pinto is a 35 year old female who is being evaluated via a billable telephone visit.      The patient has been notified of following:     \"This telephone visit will be conducted via a call between you and your physician/provider. We have found that certain health care needs can be provided without the need for a physical exam.  This service lets us provide the care you need with a short phone conversation.  If a prescription is necessary we can send it directly to your pharmacy.  If lab work is needed we can place an order for that and you can then stop by our lab to have the test done at a later time.    Telephone visits are billed at different rates depending on your insurance coverage. During this emergency period, for some insurers they may be billed the same as an in-person visit.  Please reach out to your insurance provider with any questions.    If during the course of the call the physician/provider feels a telephone visit is not appropriate, you will not be charged for this service.\"    Patient has given verbal consent for Telephone visit?  Yes    What phone number would you like to be contacted at? [phone]    How would you like to obtain your AVS? Adri Garrett     Farida Pinto is a 35 year old female who presents via phone visit today for the following health issues:    HPI       Concern for COVID-19  About how many days ago did these symptoms start? Sat Dec 19 th-positive Tues Dec 22  Is this your first visit for this illness? Yes  In the 14 days before your symptoms started, have you had close contact with someone with COVID-19 (Coronavirus)? Yes, I have been in contact with someone who has COVID-19/Coronavirus (confirmed by lab test).  Do you have a fever or chills? Yes, the highest temperature was 101.3  Are you having new or worsening difficulty breathing? Yes   Please describe what kind of difficulty you are having breathing:Moderate dyspnea (short of breath with " ADLs, shortness of breath that limits the ability to walk up one flight of stairs without needing to rest)  Do you have new or worsening cough? Yes, I am coughing up mucus.  Have you had any new or unexplained body aches? YES    Have you experienced any of the following NEW symptoms?    Headache: YES    Sore throat: No    Loss of taste or smell: YES    Chest pain: YES    Diarrhea: YES    Rash: No  What treatments have you tried? Tylenol  Who do you live with?  and 2 children  Are you, or a household member, a healthcare worker or a ? No  Do you live in a nursing home, group home, or shelter? No  Do you have a way to get food/medications if quarantined? Yes, I have a friend or family member who can help me.    Symptoms started on December 19 with some sensitivity in her stomach.  The next day her daughter developed a fever and so she was taken in for coronavirus testing and this was found to be positive.  The patient subsequently developed more shortness of breath and fever, went to [facility] on December 22 2 and had a positive rapid antigen test.  Verbal permission was received from the patient at this time to confirm and review those results in care everywhere.    Fever lasted for about 4 days and is now improved.  She is not taking any antipyretics.  Her main symptoms at this time include shortness of breath with activity.  She does have an oximeter at home that she purchased over the weekend and that is reading 99%.  She is not had any tachycardia.  She continues to have some heaviness in her chest.  Cough is productive of a small amount of yellow phlegm.  Body aches are better.                   Review of Systems   Constitutional, HEENT, cardiovascular, pulmonary, gi and gu systems are negative, except as otherwise noted.       Objective          Vitals:  No vitals were obtained today due to virtual visit.    healthy, alert and no distress  PSYCH: Alert and oriented times 3; coherent speech,  normal   rate and volume, able to articulate logical thoughts, able   to abstract reason, no tangential thoughts, no hallucinations   or delusions  Her affect is normal  RESP: No cough, no audible wheezing, able to talk in full sentences  Remainder of exam unable to be completed due to telephone visits            Assessment/Plan:    Assessment & Plan     2019 novel coronavirus disease (COVID-19)  The patient has coronavirus infection confirmed by rapid antigen testing in the context of symptoms on December 22, 2020.  At this time she seems comfortable as near as I can tell over the phone.  We talked about guidance around her pulse oximetry and pulse as far as when she should seek emergent care for her concerns regarding possible pneumonia or blood clot.  Expected course for improvement was also reviewed.  She was urged to contact the clinic with further questions.  If she needs to seek care it is recommended she pursue that in an emergency room setting due to the necessary timeliness of testing.              Return in about 2 weeks (around 1/12/2021) for recheck breathing if needed.    Hilario Jamil MD  [facility]    Phone call duration:  17 minutes

## 2021-01-11 ENCOUNTER — TELEPHONE (OUTPATIENT)
Dept: FAMILY MEDICINE | Facility: CLINIC | Age: 36
End: 2021-01-11

## 2021-01-11 ENCOUNTER — OFFICE VISIT (OUTPATIENT)
Dept: FAMILY MEDICINE | Facility: CLINIC | Age: 36
End: 2021-01-11
Payer: COMMERCIAL

## 2021-01-11 ENCOUNTER — NURSE TRIAGE (OUTPATIENT)
Dept: NURSING | Facility: CLINIC | Age: 36
End: 2021-01-11

## 2021-01-11 VITALS
BODY MASS INDEX: 22.87 KG/M2 | WEIGHT: 144 LBS | DIASTOLIC BLOOD PRESSURE: 60 MMHG | TEMPERATURE: 98.8 F | OXYGEN SATURATION: 98 % | SYSTOLIC BLOOD PRESSURE: 116 MMHG | HEART RATE: 82 BPM

## 2021-01-11 DIAGNOSIS — G93.31 POST VIRAL SYNDROME: Primary | ICD-10-CM

## 2021-01-11 DIAGNOSIS — Z86.16 HISTORY OF COVID-19: ICD-10-CM

## 2021-01-11 PROCEDURE — 99213 OFFICE O/P EST LOW 20 MIN: CPT | Performed by: FAMILY MEDICINE

## 2021-01-11 NOTE — TELEPHONE ENCOUNTER
Montana Harrison MD  [facility] All Nurse Pool             Please contact patient- if no fever since positive Covid test 12/22/2020, ok to see face to face @ 2:45PM today. Thanks Montana          12/22-12/25 - patient has had no fever since then and no fever today - discussed OK for appointment

## 2021-01-11 NOTE — PROGRESS NOTES
Assessment & Plan     Post viral syndrome  With shortness of breath, normal sats, no wheezing.  Personal history of allergies and family history of asthma.    History of COVID-19  Acute illness resolved, having some post viral difficulties.      Review of external notes as documented above     25 minutes spent on the date of the encounter doing chart review, history and exam, documentation and further activities as noted above       Patient Instructions   ER if shortness of breath worse, or chest pain  Try loratadine or fexofenadine OTC to see if drying up phlegm may help.  Also consider Robitussin-DM.  If any wheezing contact for albuterol MDI.  If anxiety is worse, contact us as lorazepam has been helpful in the past.      Return if symptoms worsen or fail to improve.    Montana Harrison MD  [facility] I[facility]bal is a 35 year old who presents to clinic today for the following health issues     HPI     Concern - Shortness Of Breath   Onset: started 12/27/2020  Description: first noticed it after the fever and aches and chill. Very winded going up the stairs, gotten better, but heaviness in chest is still there, last night it happened again and started feeling very anxious.    Intensity: mild, moderate  Progression of Symptoms:  same and intermittent  Accompanying Signs & Symptoms: light headed when that happens, can still breathe and talk but can not take deep breaths   Previous history of similar problem: September 2019, had the same symptoms but no SOB with it.   Precipitating factors:        Worsened by: taking deep breathes, going up the stairs.   Alleviating factors:        Improved by: stopping activities, sitting and focus on something peaceful, stay calm.   Therapies tried and outcome: CBD this morning.     Review of Systems   Constitutional, HEENT, cardiovascular, pulmonary, gi and gu systems are negative, except as otherwise noted.      Objective    /60    Pulse 82   Temp 98.8  F (37.1  C) (Temporal)   Wt 65.3 kg (144 lb)   SpO2 98%   BMI 22.87 kg/m    Body mass index is 22.87 kg/m .  Physical Exam   GENERAL: healthy, alert and mild distress  HENT: ear canals and TM's normal, nose and mouth without ulcers or lesions  RESP: lungs clear to auscultation - no rales, rhonchi or wheezes  CV: regular rate and rhythm, normal S1 S2, no S3 or S4, no murmur, click or rub, no peripheral edema and peripheral pulses strong  ABDOMEN: soft, nontender, no hepatosplenomegaly, no masses and bowel sounds normal  PSYCH: mentation appears normal, affect concerned

## 2021-01-11 NOTE — TELEPHONE ENCOUNTER
Please contact patient- if no fever since positive Covid test 12/22/2020, ok to see face to face @ 2:45PM today. Thanks Montana

## 2021-01-11 NOTE — TELEPHONE ENCOUNTER
RN Triage:    Spoke with 35 yr old Farida who has already scheduled a 2:45 pm OV today with PCP.    Pt transferred to triage due to c/o:    Dx with COVID-19 on 12/22/20.    Shortness of breath and constant heaviness in the chest since last night.    Pt has had bouts of chest pressure and sob since 12/27/20.    Pt had virtual visit 12/29/20 and ED visit 1/2/21.    Chest xray, EKG, and labs were negative at ED.    Pt feels fine for a few days and then symptoms recur.  Pt is uncertain if anxiety/panic is causing the flare ups.    Pt has hx of taking anxiety medication for flights.    Pt can speak in complete sentences.    O2 sats 99%.    Denies fever since 12/25/20.    Doesn't want to go to the ED as symptoms seem the same or  slightly improved since ED visit 1/2/21.    PLAN:  Protocol advises ED now.  Will consult with PCP regarding level of care:  ED or keep face to face at 2:45 pm today.  Pt prefers OV.  Please advise.  Advised pt to call back if symptoms worsen.  Jada Garcia RN  [facility]              Reason for Disposition    SEVERE or constant chest pain or pressure (Exception: mild central chest pain, present only when coughing)    Additional Information    Negative: SEVERE difficulty breathing (e.g., struggling for each breath, speaks in single words)    Negative: Difficult to awaken or acting confused (e.g., disoriented, slurred speech)    Negative: Bluish (or gray) lips or face now    Negative: Shock suspected (e.g., cold/pale/clammy skin, too weak to stand, low BP, rapid pulse)    Negative: Sounds like a life-threatening emergency to the triager    Negative: [1] COVID-19 exposure AND [2] no symptoms    Negative: [1] Lives with someone known to have influenza (flu test positive) AND [2] flu-like symptoms (e.g., cough, runny nose, sore throat, SOB; with or without fever)    Negative: [1] Adult with possible COVID-19 symptoms AND [2] triager concerned about severity of symptoms or other causes     Negative: Immunization reaction suspected (e.g., fever, headache, muscle aches occurring during days 1-3 days after immunization)    Negative: COVID-19 and breastfeeding, questions about    Protocols used: CORONAVIRUS (COVID-19) DIAGNOSED OR [id number] 12.1

## 2021-02-08 PROBLEM — Z86.16 HISTORY OF COVID-19: Status: ACTIVE | Noted: 2021-02-08

## 2021-02-08 NOTE — PATIENT INSTRUCTIONS
ER if shortness of breath worse, or chest pain  Try loratadine or fexofenadine OTC to see if drying up phlegm may help.  Also consider Robitussin-DM.  If any wheezing contact for albuterol MDI.  If anxiety is worse, contact us as lorazepam has been helpful in the past.

## 2021-04-11 ENCOUNTER — HEALTH MAINTENANCE LETTER (OUTPATIENT)
Age: 36
End: 2021-04-11

## 2021-09-26 ENCOUNTER — HEALTH MAINTENANCE LETTER (OUTPATIENT)
Age: 36
End: 2021-09-26

## 2022-01-16 ENCOUNTER — HEALTH MAINTENANCE LETTER (OUTPATIENT)
Age: 37
End: 2022-01-16

## 2023-01-08 ENCOUNTER — HEALTH MAINTENANCE LETTER (OUTPATIENT)
Age: 38
End: 2023-01-08

## 2023-04-23 ENCOUNTER — HEALTH MAINTENANCE LETTER (OUTPATIENT)
Age: 38
End: 2023-04-23